# Patient Record
Sex: MALE | Race: WHITE | NOT HISPANIC OR LATINO | Employment: OTHER | URBAN - METROPOLITAN AREA
[De-identification: names, ages, dates, MRNs, and addresses within clinical notes are randomized per-mention and may not be internally consistent; named-entity substitution may affect disease eponyms.]

---

## 2018-06-23 ENCOUNTER — APPOINTMENT (OUTPATIENT)
Dept: CARDIOLOGY | Facility: CLINIC | Age: 83
DRG: 280 | End: 2018-06-23
Attending: INTERNAL MEDICINE
Payer: MEDICARE

## 2018-06-23 ENCOUNTER — APPOINTMENT (OUTPATIENT)
Dept: GENERAL RADIOLOGY | Facility: CLINIC | Age: 83
DRG: 280 | End: 2018-06-23
Attending: EMERGENCY MEDICINE
Payer: MEDICARE

## 2018-06-23 ENCOUNTER — HOSPITAL ENCOUNTER (INPATIENT)
Facility: CLINIC | Age: 83
LOS: 1 days | Discharge: HOME | DRG: 280 | End: 2018-06-24
Attending: EMERGENCY MEDICINE | Admitting: INTERNAL MEDICINE
Payer: MEDICARE

## 2018-06-23 ENCOUNTER — APPOINTMENT (OUTPATIENT)
Dept: ULTRASOUND IMAGING | Facility: CLINIC | Age: 83
DRG: 280 | End: 2018-06-23
Attending: INTERNAL MEDICINE
Payer: MEDICARE

## 2018-06-23 ENCOUNTER — APPOINTMENT (OUTPATIENT)
Dept: GENERAL RADIOLOGY | Facility: CLINIC | Age: 83
DRG: 280 | End: 2018-06-23
Attending: INTERNAL MEDICINE
Payer: MEDICARE

## 2018-06-23 DIAGNOSIS — J81.0 ACUTE PULMONARY EDEMA (H): ICD-10-CM

## 2018-06-23 DIAGNOSIS — I21.A1 TYPE 2 MYOCARDIAL INFARCTION (H): ICD-10-CM

## 2018-06-23 PROBLEM — R07.9 CHEST PAIN: Status: ACTIVE | Noted: 2018-06-23

## 2018-06-23 LAB
ALBUMIN SERPL-MCNC: 3.6 G/DL (ref 3.4–5)
ALBUMIN UR-MCNC: 10 MG/DL
ALP SERPL-CCNC: 124 U/L (ref 40–150)
ALP SERPL-CCNC: 126 U/L (ref 40–150)
ALP SERPL-CCNC: 131 U/L (ref 40–150)
ALT SERPL W P-5'-P-CCNC: 62 U/L (ref 0–70)
ALT SERPL W P-5'-P-CCNC: 69 U/L (ref 0–70)
ALT SERPL W P-5'-P-CCNC: 72 U/L (ref 0–70)
ANION GAP SERPL CALCULATED.3IONS-SCNC: 11 MMOL/L (ref 3–14)
ANION GAP SERPL CALCULATED.3IONS-SCNC: 11 MMOL/L (ref 3–14)
ANION GAP SERPL CALCULATED.3IONS-SCNC: 12 MMOL/L (ref 3–14)
ANION GAP SERPL CALCULATED.3IONS-SCNC: 9 MMOL/L (ref 3–14)
APPEARANCE UR: CLEAR
AST SERPL W P-5'-P-CCNC: 60 U/L (ref 0–45)
AST SERPL W P-5'-P-CCNC: 74 U/L (ref 0–45)
AST SERPL W P-5'-P-CCNC: 92 U/L (ref 0–45)
BASOPHILS # BLD AUTO: 0.1 10E9/L (ref 0–0.2)
BASOPHILS NFR BLD AUTO: 0.5 %
BILIRUB SERPL-MCNC: 0.3 MG/DL (ref 0.2–1.3)
BILIRUB SERPL-MCNC: 0.4 MG/DL (ref 0.2–1.3)
BILIRUB SERPL-MCNC: 0.7 MG/DL (ref 0.2–1.3)
BILIRUB UR QL STRIP: NEGATIVE
BUN SERPL-MCNC: 35 MG/DL (ref 7–30)
BUN SERPL-MCNC: 35 MG/DL (ref 7–30)
BUN SERPL-MCNC: 37 MG/DL (ref 7–30)
BUN SERPL-MCNC: 37 MG/DL (ref 7–30)
CALCIUM SERPL-MCNC: 8.4 MG/DL (ref 8.5–10.1)
CALCIUM SERPL-MCNC: 8.4 MG/DL (ref 8.5–10.1)
CALCIUM SERPL-MCNC: 8.5 MG/DL (ref 8.5–10.1)
CALCIUM SERPL-MCNC: 8.6 MG/DL (ref 8.5–10.1)
CHLORIDE SERPL-SCNC: 104 MMOL/L (ref 94–109)
CHLORIDE SERPL-SCNC: 106 MMOL/L (ref 94–109)
CHLORIDE SERPL-SCNC: 106 MMOL/L (ref 94–109)
CHLORIDE SERPL-SCNC: 107 MMOL/L (ref 94–109)
CO2 BLDCOV-SCNC: 24 MMOL/L (ref 21–28)
CO2 SERPL-SCNC: 21 MMOL/L (ref 20–32)
CO2 SERPL-SCNC: 23 MMOL/L (ref 20–32)
CO2 SERPL-SCNC: 24 MMOL/L (ref 20–32)
CO2 SERPL-SCNC: 24 MMOL/L (ref 20–32)
COLOR UR AUTO: ABNORMAL
CREAT SERPL-MCNC: 1.44 MG/DL (ref 0.66–1.25)
CREAT SERPL-MCNC: 1.45 MG/DL (ref 0.66–1.25)
CREAT SERPL-MCNC: 1.45 MG/DL (ref 0.66–1.25)
CREAT SERPL-MCNC: 1.56 MG/DL (ref 0.66–1.25)
DIFFERENTIAL METHOD BLD: ABNORMAL
EOSINOPHIL # BLD AUTO: 0.2 10E9/L (ref 0–0.7)
EOSINOPHIL NFR BLD AUTO: 1.9 %
ERYTHROCYTE [DISTWIDTH] IN BLOOD BY AUTOMATED COUNT: 14.5 % (ref 10–15)
ERYTHROCYTE [DISTWIDTH] IN BLOOD BY AUTOMATED COUNT: 14.6 % (ref 10–15)
ERYTHROCYTE [DISTWIDTH] IN BLOOD BY AUTOMATED COUNT: 14.6 % (ref 10–15)
GFR SERPL CREATININE-BSD FRML MDRD: 43 ML/MIN/1.7M2
GFR SERPL CREATININE-BSD FRML MDRD: 46 ML/MIN/1.7M2
GFR SERPL CREATININE-BSD FRML MDRD: 46 ML/MIN/1.7M2
GFR SERPL CREATININE-BSD FRML MDRD: 47 ML/MIN/1.7M2
GLUCOSE SERPL-MCNC: 108 MG/DL (ref 70–99)
GLUCOSE SERPL-MCNC: 119 MG/DL (ref 70–99)
GLUCOSE SERPL-MCNC: 205 MG/DL (ref 70–99)
GLUCOSE SERPL-MCNC: 228 MG/DL (ref 70–99)
GLUCOSE UR STRIP-MCNC: NEGATIVE MG/DL
HCT VFR BLD AUTO: 34.9 % (ref 40–53)
HCT VFR BLD AUTO: 36.4 % (ref 40–53)
HCT VFR BLD AUTO: 36.5 % (ref 40–53)
HGB BLD-MCNC: 11.8 G/DL (ref 13.3–17.7)
HGB BLD-MCNC: 11.8 G/DL (ref 13.3–17.7)
HGB BLD-MCNC: 12.1 G/DL (ref 13.3–17.7)
HGB UR QL STRIP: NEGATIVE
HYALINE CASTS #/AREA URNS LPF: 1 /LPF (ref 0–2)
IMM GRANULOCYTES # BLD: 0 10E9/L (ref 0–0.4)
IMM GRANULOCYTES NFR BLD: 0.4 %
KETONES UR STRIP-MCNC: NEGATIVE MG/DL
LACTATE BLD-SCNC: 1.8 MMOL/L (ref 0.7–2.1)
LEUKOCYTE ESTERASE UR QL STRIP: NEGATIVE
LMWH PPP CHRO-ACNC: 0.34 IU/ML
LMWH PPP CHRO-ACNC: 0.63 IU/ML
LYMPHOCYTES # BLD AUTO: 2 10E9/L (ref 0.8–5.3)
LYMPHOCYTES NFR BLD AUTO: 20.9 %
MAGNESIUM SERPL-MCNC: 1.8 MG/DL (ref 1.6–2.3)
MAGNESIUM SERPL-MCNC: 1.8 MG/DL (ref 1.6–2.3)
MCH RBC QN AUTO: 30.3 PG (ref 26.5–33)
MCH RBC QN AUTO: 30.8 PG (ref 26.5–33)
MCH RBC QN AUTO: 31.1 PG (ref 26.5–33)
MCHC RBC AUTO-ENTMCNC: 32.3 G/DL (ref 31.5–36.5)
MCHC RBC AUTO-ENTMCNC: 33.2 G/DL (ref 31.5–36.5)
MCHC RBC AUTO-ENTMCNC: 33.8 G/DL (ref 31.5–36.5)
MCV RBC AUTO: 92 FL (ref 78–100)
MCV RBC AUTO: 93 FL (ref 78–100)
MCV RBC AUTO: 94 FL (ref 78–100)
MONOCYTES # BLD AUTO: 0.6 10E9/L (ref 0–1.3)
MONOCYTES NFR BLD AUTO: 5.8 %
NEUTROPHILS # BLD AUTO: 6.7 10E9/L (ref 1.6–8.3)
NEUTROPHILS NFR BLD AUTO: 70.5 %
NITRATE UR QL: NEGATIVE
NRBC # BLD AUTO: 0 10*3/UL
NRBC BLD AUTO-RTO: 0 /100
NT-PROBNP SERPL-MCNC: 2358 PG/ML (ref 0–1800)
PCO2 BLDV: 49 MM HG (ref 40–50)
PH BLDV: 7.31 PH (ref 7.32–7.43)
PH UR STRIP: 5 PH (ref 5–7)
PHOSPHATE SERPL-MCNC: 4.4 MG/DL (ref 2.5–4.5)
PLATELET # BLD AUTO: 218 10E9/L (ref 150–450)
PLATELET # BLD AUTO: 225 10E9/L (ref 150–450)
PLATELET # BLD AUTO: 227 10E9/L (ref 150–450)
PO2 BLDV: 22 MM HG (ref 25–47)
POTASSIUM SERPL-SCNC: 3.6 MMOL/L (ref 3.4–5.3)
POTASSIUM SERPL-SCNC: 3.7 MMOL/L (ref 3.4–5.3)
POTASSIUM SERPL-SCNC: 4.3 MMOL/L (ref 3.4–5.3)
POTASSIUM SERPL-SCNC: 4.7 MMOL/L (ref 3.4–5.3)
PROT SERPL-MCNC: 7.1 G/DL (ref 6.8–8.8)
RBC # BLD AUTO: 3.79 10E12/L (ref 4.4–5.9)
RBC # BLD AUTO: 3.9 10E12/L (ref 4.4–5.9)
RBC # BLD AUTO: 3.93 10E12/L (ref 4.4–5.9)
RBC #/AREA URNS AUTO: 0 /HPF (ref 0–2)
SAO2 % BLDV FROM PO2: 31 %
SODIUM SERPL-SCNC: 139 MMOL/L (ref 133–144)
SODIUM SERPL-SCNC: 141 MMOL/L (ref 133–144)
SOURCE: ABNORMAL
SP GR UR STRIP: 1 (ref 1–1.03)
TROPONIN I BLD-MCNC: 0.09 UG/L (ref 0–0.1)
TROPONIN I SERPL-MCNC: 0.1 UG/L (ref 0–0.04)
TROPONIN I SERPL-MCNC: 0.6 UG/L (ref 0–0.04)
TROPONIN I SERPL-MCNC: 1.43 UG/L (ref 0–0.04)
TROPONIN I SERPL-MCNC: 2.52 UG/L (ref 0–0.04)
TROPONIN I SERPL-MCNC: 2.91 UG/L (ref 0–0.04)
TROPONIN I SERPL-MCNC: 3.3 UG/L (ref 0–0.04)
UROBILINOGEN UR STRIP-MCNC: NORMAL MG/DL (ref 0–2)
WBC # BLD AUTO: 11.5 10E9/L (ref 4–11)
WBC # BLD AUTO: 15.2 10E9/L (ref 4–11)
WBC # BLD AUTO: 9.5 10E9/L (ref 4–11)
WBC #/AREA URNS AUTO: <1 /HPF (ref 0–5)

## 2018-06-23 PROCEDURE — 85025 COMPLETE CBC W/AUTO DIFF WBC: CPT | Performed by: EMERGENCY MEDICINE

## 2018-06-23 PROCEDURE — 85520 HEPARIN ASSAY: CPT | Performed by: INTERNAL MEDICINE

## 2018-06-23 PROCEDURE — 93308 TTE F-UP OR LMTD: CPT | Performed by: EMERGENCY MEDICINE

## 2018-06-23 PROCEDURE — 80048 BASIC METABOLIC PNL TOTAL CA: CPT | Performed by: INTERNAL MEDICINE

## 2018-06-23 PROCEDURE — 96375 TX/PRO/DX INJ NEW DRUG ADDON: CPT | Performed by: EMERGENCY MEDICINE

## 2018-06-23 PROCEDURE — 96376 TX/PRO/DX INJ SAME DRUG ADON: CPT | Performed by: EMERGENCY MEDICINE

## 2018-06-23 PROCEDURE — 85027 COMPLETE CBC AUTOMATED: CPT | Performed by: EMERGENCY MEDICINE

## 2018-06-23 PROCEDURE — 93306 TTE W/DOPPLER COMPLETE: CPT

## 2018-06-23 PROCEDURE — 84484 ASSAY OF TROPONIN QUANT: CPT | Performed by: EMERGENCY MEDICINE

## 2018-06-23 PROCEDURE — 36415 COLL VENOUS BLD VENIPUNCTURE: CPT | Performed by: INTERNAL MEDICINE

## 2018-06-23 PROCEDURE — 85027 COMPLETE CBC AUTOMATED: CPT | Performed by: INTERNAL MEDICINE

## 2018-06-23 PROCEDURE — 80053 COMPREHEN METABOLIC PANEL: CPT | Performed by: EMERGENCY MEDICINE

## 2018-06-23 PROCEDURE — 12000012 ZZH R&B MS OVERFLOW UMMC

## 2018-06-23 PROCEDURE — A9270 NON-COVERED ITEM OR SERVICE: HCPCS | Mod: GY | Performed by: INTERNAL MEDICINE

## 2018-06-23 PROCEDURE — 83735 ASSAY OF MAGNESIUM: CPT | Performed by: INTERNAL MEDICINE

## 2018-06-23 PROCEDURE — 84100 ASSAY OF PHOSPHORUS: CPT | Performed by: INTERNAL MEDICINE

## 2018-06-23 PROCEDURE — 99223 1ST HOSP IP/OBS HIGH 75: CPT | Mod: 25 | Performed by: INTERNAL MEDICINE

## 2018-06-23 PROCEDURE — 25000128 H RX IP 250 OP 636: Performed by: INTERNAL MEDICINE

## 2018-06-23 PROCEDURE — 25000132 ZZH RX MED GY IP 250 OP 250 PS 637: Mod: GY | Performed by: INTERNAL MEDICINE

## 2018-06-23 PROCEDURE — 25000128 H RX IP 250 OP 636: Performed by: EMERGENCY MEDICINE

## 2018-06-23 PROCEDURE — 84484 ASSAY OF TROPONIN QUANT: CPT | Performed by: INTERNAL MEDICINE

## 2018-06-23 PROCEDURE — 99285 EMERGENCY DEPT VISIT HI MDM: CPT | Mod: 25 | Performed by: EMERGENCY MEDICINE

## 2018-06-23 PROCEDURE — 99291 CRITICAL CARE FIRST HOUR: CPT | Mod: 25 | Performed by: EMERGENCY MEDICINE

## 2018-06-23 PROCEDURE — 80053 COMPREHEN METABOLIC PANEL: CPT | Performed by: INTERNAL MEDICINE

## 2018-06-23 PROCEDURE — 76770 US EXAM ABDO BACK WALL COMP: CPT | Mod: XS

## 2018-06-23 PROCEDURE — 71045 X-RAY EXAM CHEST 1 VIEW: CPT | Mod: 77

## 2018-06-23 PROCEDURE — 93306 TTE W/DOPPLER COMPLETE: CPT | Mod: 26 | Performed by: INTERNAL MEDICINE

## 2018-06-23 PROCEDURE — 82803 BLOOD GASES ANY COMBINATION: CPT

## 2018-06-23 PROCEDURE — 25500064 ZZH RX 255 OP 636: Performed by: INTERNAL MEDICINE

## 2018-06-23 PROCEDURE — 93010 ELECTROCARDIOGRAM REPORT: CPT | Mod: 59 | Performed by: EMERGENCY MEDICINE

## 2018-06-23 PROCEDURE — 81001 URINALYSIS AUTO W/SCOPE: CPT | Performed by: EMERGENCY MEDICINE

## 2018-06-23 PROCEDURE — 84484 ASSAY OF TROPONIN QUANT: CPT

## 2018-06-23 PROCEDURE — 93308 TTE F-UP OR LMTD: CPT | Mod: 26 | Performed by: EMERGENCY MEDICINE

## 2018-06-23 PROCEDURE — 83880 ASSAY OF NATRIURETIC PEPTIDE: CPT | Performed by: EMERGENCY MEDICINE

## 2018-06-23 PROCEDURE — 93005 ELECTROCARDIOGRAM TRACING: CPT | Performed by: EMERGENCY MEDICINE

## 2018-06-23 PROCEDURE — 71045 X-RAY EXAM CHEST 1 VIEW: CPT

## 2018-06-23 PROCEDURE — 96365 THER/PROPH/DIAG IV INF INIT: CPT | Performed by: EMERGENCY MEDICINE

## 2018-06-23 PROCEDURE — 83605 ASSAY OF LACTIC ACID: CPT

## 2018-06-23 RX ORDER — MULTIVIT WITH MINERALS/LUTEIN
1 TABLET ORAL DAILY
COMMUNITY

## 2018-06-23 RX ORDER — ISOSORBIDE MONONITRATE 30 MG/1
30 TABLET, EXTENDED RELEASE ORAL DAILY
Status: DISCONTINUED | OUTPATIENT
Start: 2018-06-23 | End: 2018-06-23

## 2018-06-23 RX ORDER — POTASSIUM CHLORIDE 750 MG/1
40 TABLET, EXTENDED RELEASE ORAL ONCE
Status: COMPLETED | OUTPATIENT
Start: 2018-06-23 | End: 2018-06-23

## 2018-06-23 RX ORDER — POTASSIUM CHLORIDE 750 MG/1
20-40 TABLET, EXTENDED RELEASE ORAL
Status: DISCONTINUED | OUTPATIENT
Start: 2018-06-23 | End: 2018-06-24 | Stop reason: HOSPADM

## 2018-06-23 RX ORDER — MAGNESIUM SULFATE HEPTAHYDRATE 40 MG/ML
2 INJECTION, SOLUTION INTRAVENOUS ONCE
Status: COMPLETED | OUTPATIENT
Start: 2018-06-23 | End: 2018-06-23

## 2018-06-23 RX ORDER — POTASSIUM CHLORIDE 29.8 MG/ML
20 INJECTION INTRAVENOUS
Status: DISCONTINUED | OUTPATIENT
Start: 2018-06-23 | End: 2018-06-24 | Stop reason: HOSPADM

## 2018-06-23 RX ORDER — OMEGA-3-ACID ETHYL ESTERS 1 G/1
2 CAPSULE, LIQUID FILLED ORAL DAILY
COMMUNITY

## 2018-06-23 RX ORDER — LOSARTAN POTASSIUM 50 MG/1
50 TABLET ORAL DAILY
Status: DISCONTINUED | OUTPATIENT
Start: 2018-06-23 | End: 2018-06-23

## 2018-06-23 RX ORDER — ISOSORBIDE MONONITRATE 30 MG/1
30 TABLET, EXTENDED RELEASE ORAL DAILY
Status: DISCONTINUED | OUTPATIENT
Start: 2018-06-24 | End: 2018-06-24 | Stop reason: HOSPADM

## 2018-06-23 RX ORDER — POTASSIUM CHLORIDE 7.45 MG/ML
10 INJECTION INTRAVENOUS
Status: DISCONTINUED | OUTPATIENT
Start: 2018-06-23 | End: 2018-06-24 | Stop reason: HOSPADM

## 2018-06-23 RX ORDER — LOPERAMIDE HCL 2 MG
2 CAPSULE ORAL 4 TIMES DAILY PRN
COMMUNITY

## 2018-06-23 RX ORDER — ASPIRIN 81 MG/1
81 TABLET, CHEWABLE ORAL DAILY
Status: DISCONTINUED | OUTPATIENT
Start: 2018-06-23 | End: 2018-06-24 | Stop reason: HOSPADM

## 2018-06-23 RX ORDER — NALOXONE HYDROCHLORIDE 0.4 MG/ML
.1-.4 INJECTION, SOLUTION INTRAMUSCULAR; INTRAVENOUS; SUBCUTANEOUS
Status: DISCONTINUED | OUTPATIENT
Start: 2018-06-23 | End: 2018-06-24 | Stop reason: HOSPADM

## 2018-06-23 RX ORDER — POTASSIUM CHLORIDE 1.5 G/1.58G
40 POWDER, FOR SOLUTION ORAL ONCE
Status: COMPLETED | OUTPATIENT
Start: 2018-06-23 | End: 2018-06-23

## 2018-06-23 RX ORDER — ATORVASTATIN CALCIUM 40 MG/1
40 TABLET, FILM COATED ORAL
Status: DISCONTINUED | OUTPATIENT
Start: 2018-06-23 | End: 2018-06-24 | Stop reason: HOSPADM

## 2018-06-23 RX ORDER — LIDOCAINE 40 MG/G
CREAM TOPICAL
Status: DISCONTINUED | OUTPATIENT
Start: 2018-06-23 | End: 2018-06-24 | Stop reason: HOSPADM

## 2018-06-23 RX ORDER — CLOPIDOGREL BISULFATE 75 MG/1
75 TABLET ORAL DAILY
Status: DISCONTINUED | OUTPATIENT
Start: 2018-06-23 | End: 2018-06-24 | Stop reason: HOSPADM

## 2018-06-23 RX ORDER — PANTOPRAZOLE SODIUM 40 MG/1
40 TABLET, DELAYED RELEASE ORAL EVERY MORNING
Status: DISCONTINUED | OUTPATIENT
Start: 2018-06-23 | End: 2018-06-24 | Stop reason: HOSPADM

## 2018-06-23 RX ORDER — HEPARIN SODIUM 10000 [USP'U]/100ML
0-3500 INJECTION, SOLUTION INTRAVENOUS CONTINUOUS
Status: DISCONTINUED | OUTPATIENT
Start: 2018-06-23 | End: 2018-06-24 | Stop reason: HOSPADM

## 2018-06-23 RX ORDER — FUROSEMIDE 10 MG/ML
40 INJECTION INTRAMUSCULAR; INTRAVENOUS ONCE
Status: COMPLETED | OUTPATIENT
Start: 2018-06-23 | End: 2018-06-23

## 2018-06-23 RX ORDER — POTASSIUM CHLORIDE 1.5 G/1.58G
20-40 POWDER, FOR SOLUTION ORAL
Status: DISCONTINUED | OUTPATIENT
Start: 2018-06-23 | End: 2018-06-24 | Stop reason: HOSPADM

## 2018-06-23 RX ORDER — UBIDECARENONE 200 MG
200 CAPSULE ORAL DAILY
COMMUNITY

## 2018-06-23 RX ORDER — ISOSORBIDE MONONITRATE 30 MG/1
60 TABLET, EXTENDED RELEASE ORAL DAILY
Status: DISCONTINUED | OUTPATIENT
Start: 2018-06-23 | End: 2018-06-23

## 2018-06-23 RX ORDER — POTASSIUM CL/LIDO/0.9 % NACL 10MEQ/0.1L
10 INTRAVENOUS SOLUTION, PIGGYBACK (ML) INTRAVENOUS
Status: DISCONTINUED | OUTPATIENT
Start: 2018-06-23 | End: 2018-06-24 | Stop reason: HOSPADM

## 2018-06-23 RX ADMIN — Medication 12.5 MG: at 19:50

## 2018-06-23 RX ADMIN — HUMAN ALBUMIN MICROSPHERES AND PERFLUTREN 4 ML: 10; .22 INJECTION, SOLUTION INTRAVENOUS at 10:15

## 2018-06-23 RX ADMIN — HEPARIN SODIUM 950 UNITS/HR: 10000 INJECTION, SOLUTION INTRAVENOUS at 02:59

## 2018-06-23 RX ADMIN — ISOSORBIDE MONONITRATE 60 MG: 30 TABLET, EXTENDED RELEASE ORAL at 08:05

## 2018-06-23 RX ADMIN — FUROSEMIDE 40 MG: 10 INJECTION, SOLUTION INTRAVENOUS at 08:05

## 2018-06-23 RX ADMIN — ASPIRIN 81 MG CHEWABLE TABLET 81 MG: 81 TABLET CHEWABLE at 08:05

## 2018-06-23 RX ADMIN — POTASSIUM CHLORIDE 40 MEQ: 1.5 POWDER, FOR SOLUTION ORAL at 18:55

## 2018-06-23 RX ADMIN — ATORVASTATIN CALCIUM 40 MG: 40 TABLET, FILM COATED ORAL at 19:49

## 2018-06-23 RX ADMIN — PANTOPRAZOLE SODIUM 40 MG: 40 TABLET, DELAYED RELEASE ORAL at 08:05

## 2018-06-23 RX ADMIN — CLOPIDOGREL 75 MG: 75 TABLET, FILM COATED ORAL at 08:05

## 2018-06-23 RX ADMIN — Medication 12.5 MG: at 11:34

## 2018-06-23 RX ADMIN — MAGNESIUM SULFATE HEPTAHYDRATE 2 G: 40 INJECTION, SOLUTION INTRAVENOUS at 23:22

## 2018-06-23 RX ADMIN — POTASSIUM CHLORIDE 40 MEQ: 750 TABLET, EXTENDED RELEASE ORAL at 09:40

## 2018-06-23 RX ADMIN — FUROSEMIDE 40 MG: 10 INJECTION, SOLUTION INTRAVENOUS at 01:25

## 2018-06-23 NOTE — ED NOTES
Pt told EMS he is on blood thinners but did not know the names, the bottles were not labeled at the house    He got 4 baby ASA from EMS.

## 2018-06-23 NOTE — ED TRIAGE NOTES
Pt woke up at home per a friend and was SOB. He was given 6 nitros, and 2 duonebs. He is on cpap from ems, he is from [place] and we do not have records here. He is also hypertensive per EMS.

## 2018-06-23 NOTE — PROGRESS NOTES
Pt arrived to  room number 4503 from ED at 0400 as 6C overflow. Pt orientated to room and unit. Pt's call light within reach. All questions answered.   Pt ST, BP 140s/90s -110s, and on 30% Bipap. Bipap d/c'd immediately and placed on 4 L NC. Pt denies chest pain and SOB. Notified MD troponin increased from 0.1 to 0.597. Plan to continue with heparin gtt. TTE ordered for am, will recheck troponin at 0500.

## 2018-06-23 NOTE — PROVIDER NOTIFICATION
0548 MD notified of critical lab result of troponin of 1.4. No orders written at this time.  Plan to continue with heparin gtt.

## 2018-06-23 NOTE — PLAN OF CARE
Problem: Patient Care Overview  Goal: Plan of Care/Patient Progress Review  Outcome: Improving  Afebrile, VSS.  Patient denies chest pain throughout the day.  Troponin peaked, now trending down.  No cath lab unless active chest pain per MD.  Heparin drip adjusted based on Xa level, currently at 750 units/hr and now therapeutic.  Additional dose of Lasix given this AM.  Patient having runs of SVT and leg cramps this afternoon.  Labs checked, potassium replacement ordered.  On cardiac diet, tolerating well.  Plan for discharge in the morning if no chest pain overnight and when patient is up and walking around.

## 2018-06-23 NOTE — IP AVS SNAPSHOT
[address] [address]    Phone:  [phone]                                       After Visit Summary   6/23/2018    Kane Richards    MRN: [id number]           After Visit Summary Signature Page     I have received my discharge instructions, and my questions have been answered. I have discussed any challenges I see with this plan with the nurse or doctor.    ..........................................................................................................................................  Patient/Patient Representative Signature      ..........................................................................................................................................  Patient Representative Print Name and Relationship to Patient    ..................................................               ................................................  Date                                            Time    ..........................................................................................................................................  Reviewed by Signature/Title    ...................................................              ..............................................  Date                                                            Time

## 2018-06-23 NOTE — PLAN OF CARE
Problem: Patient Care Overview  Goal: Plan of Care/Patient Progress Review  Outcome: No Change  Assessment:   Cardiac: SR/ST and VSS. Heparing gtt infusing.   Resp: Tolerating 4 L NC.   Neuro: Alert and orientated.   : Voiding per urinal.   GI:  NPO  Pain:  Denies    Interventions: See previous note for events and interventions done during shift.        Plan: Will continue to monitor/assess and update MD as needed. TTE ordered for today, possible cath lab.

## 2018-06-23 NOTE — IP AVS SNAPSHOT
MRN:[id number]                      After Visit Summary   6/23/2018    Kane Richards    MRN: [id number]           Thank you!     Thank you for choosing [facility] for your care. Our goal is always to provide you with excellent care. Hearing back from our patients is one way we can continue to improve our services. Please take a few minutes to complete the written survey that you may receive in the mail after you visit with us. Thank you!        Patient Information     Date Of Birth          6/9/1934        Designated Caregiver       Most Recent Value    Caregiver    Will someone help with your care after discharge? yes    Name of designated caregiver Prince    Phone number of caregiver na    Caregiver address na      About your hospital stay     You were admitted on:  June 23, 2018 You last received care in the:  [address] [facility] [place]    You were discharged on:  June 24, 2018        Reason for your hospital stay       You were hospitalized for chest pain and elevated blood pressures. Your treatment included medications.                  Who to Call     For medical emergencies, please call 911.  For non-urgent questions about your medical care, please call your primary care provider or clinic, None          Attending Provider     Provider Specialty    Ramana Novak MD Emergency Medicine    Edy Boyce MD Clinical Cardiac Electrophysiology    Rohini Obrien MD Cardiology       Primary Care Provider Fax #    Physician No Ref-Primary [phone]      After Care Instructions     Activity       Your activity upon discharge: activity as tolerated            Diet       Follow this diet upon discharge: Orders Placed This Encounter      Fluid restriction 2000 ML FLUID      Low Saturated Fat Na <2400 mg                  Follow-up Appointments     Follow Up and recommended labs and tests       Follow up with primary care provider, Physician No Ref-Primary, within 7 days for hospital follow- up.   "The following labs/tests are recommended: BMP.                  Pending Results     Date and Time Order Name Status Description    2018 0133 EKG 12 lead Preliminary     2018 0133 EKG 12 lead Preliminary     2018 0115 EKG 12 lead Preliminary             Statement of Approval     Ordered          18 1117  I have reviewed and agree with all the recommendations and orders detailed in this document.  EFFECTIVE NOW     Approved and electronically signed by:  Yossi Cheng MD             Admission Information     Date & Time Provider Department Dept. Phone    2018 Rohini Obrien MD [address] [facility] [place] [phone]      Your Vitals Were     Blood Pressure Pulse Temperature Respirations Weight Pulse Oximetry    152/67 (BP Location: Left arm) 108 98  F (36.7  C) (Oral) 16 76.5 kg (168 lb 10.4 oz) 94%      MyChart Information     [employer] lets you send messages to your doctor, view your test results, renew your prescriptions, schedule appointments and more. To sign up, go to [email or web address] . Click on \"Log in\" on the left side of the screen, which will take you to the Welcome page. Then click on \"Sign up Now\" on the right side of the page.     You will be asked to enter the access code listed below, as well as some personal information. Please follow the directions to create your username and password.     Your access code is: [email or web address]  Expires: 2018 12:16 PM     Your access code will  in 90 days. If you need help or a new code, please call your [facility] clinic or [phone].        Care EveryWhere ID     This is your Care EveryWhere ID. This could be used by other organizations to access your [facility] medical records  [id number]        Equal Access to Services     [facility] ROLANDA : Irene Lewis, wamarcus bella, qaludivina kasharita viera, neelam medina. So [facility] [phone].    ATENCIÓN: Si habla español, tiene a bocanegra disposición " servicios gratuitos de asistencia lingüística. Gwen russ [phone].    We comply with applicable federal civil rights laws and [place] laws. We do not discriminate on the basis of race, color, national origin, age, disability, sex, sexual orientation, or gender identity.               Review of your medicines      CONTINUE these medicines which have NOT CHANGED        Dose / Directions    ASPIRIN PO        Dose:  81 mg   Take 81 mg by mouth daily   Refills:  0       ATORVASTATIN CALCIUM PO        Dose:  40 mg   Take 40 mg by mouth daily   Refills:  0       CENTRUM SILVER per tablet        Dose:  1 tablet   Take 1 tablet by mouth daily   Refills:  0       CoQ-10 200 MG Caps        Dose:  200 mg   Take 200 mg by mouth daily   Refills:  0       COZAAR PO        Dose:  100 mg   Take 100 mg by mouth daily   Refills:  0       ISOSORBIDE MONONITRATE PO        Dose:  30 mg   Take 30 mg by mouth daily   Refills:  0       loperamide 2 MG capsule   Commonly known as:  IMODIUM        Dose:  2 mg   Take 2 mg by mouth 4 times daily as needed for diarrhea   Refills:  0       LOPRESSOR PO        Dose:  25 mg   Take 25 mg by mouth 2 times daily   Refills:  0       omega-3 acid ethyl esters 1 g capsule   Commonly known as:  Lovaza        Dose:  2 g   Take 2 g by mouth daily   Refills:  0       PANTOPRAZOLE SODIUM PO        Dose:  40 mg   Take 40 mg by mouth every morning (before breakfast)   Refills:  0       PLAVIX PO        Dose:  75 mg   Take 75 mg by mouth daily   Refills:  0       TRAMADOL HCL PO        Refills:  0       VITAMIN E BLEND PO        Dose:  1000 Units   Take 1,000 Units by mouth daily   Refills:  0         STOP taking     ADVIL PO                    Protect others around you: Learn how to safely use, store and throw away your medicines at www.disposemymeds.org.        Information about OPIOIDS     PRESCRIPTION OPIOIDS: WHAT YOU NEED TO KNOW   We gave you an opioid (narcotic) pain medicine. It is important to  manage your pain, but opioids are not always the best choice. You should first try all the other options your care team gave you. Take this medicine for as short a time (and as few doses) as possible.     These medicines have risks:    DO NOT drive when on new or higher doses of pain medicine. These medicines can affect your alertness and reaction times, and you could be arrested for driving under the influence (DUI). If you need to use opioids long-term, talk to your care team about driving.    DO NOT operate heave machinery    DO NOT do any other dangerous activities while taking these medicines.     DO NOT drink any alcohol while taking these medicines.      If the opioid prescribed includes acetaminophen, DO NOT take with any other medicines that contain acetaminophen. Read all labels carefully. Look for the word  acetaminophen  or  Tylenol.  Ask your pharmacist if you have questions or are unsure.    You can get addicted to pain medicines, especially if you have a history of addiction (chemical, alcohol or substance dependence). Talk to your care team about ways to reduce this risk.    Store your pills in a secure place, locked if possible. We will not replace any lost or stolen medicine. If you don t finish your medicine, please throw away (dispose) as directed by your pharmacist. The [place] Pollution Control Agency has more information about safe disposal: https://www.pca.[facility].mn.us/living-green/managing-unwanted-medications.     All opioids tend to cause constipation. Drink plenty of water and eat foods that have a lot of fiber, such as fruits, vegetables, prune juice, apple juice and high-fiber cereal. Take a laxative (Miralax, milk of magnesia, Colace, Senna) if you don t move your bowels at least every other day.              Medication List: This is a list of all your medications and when to take them. Check marks below indicate your daily home schedule. Keep this list as a reference.       Medications           Morning Afternoon Evening Bedtime As Needed    ASPIRIN PO   Take 81 mg by mouth daily   Last time this was given:  81 mg on 6/24/2018  7:40 AM                                ATORVASTATIN CALCIUM PO   Take 40 mg by mouth daily   Last time this was given:  40 mg on 6/23/2018  7:49 PM                                CENTRUM SILVER per tablet   Take 1 tablet by mouth daily                                CoQ-10 200 MG Caps   Take 200 mg by mouth daily                                COZAAR PO   Take 100 mg by mouth daily                                ISOSORBIDE MONONITRATE PO   Take 30 mg by mouth daily                                loperamide 2 MG capsule   Commonly known as:  IMODIUM   Take 2 mg by mouth 4 times daily as needed for diarrhea                                LOPRESSOR PO   Take 25 mg by mouth 2 times daily                                omega-3 acid ethyl esters 1 g capsule   Commonly known as:  Lovaza   Take 2 g by mouth daily                                PANTOPRAZOLE SODIUM PO   Take 40 mg by mouth every morning (before breakfast)   Last time this was given:  40 mg on 6/24/2018  7:40 AM                                PLAVIX PO   Take 75 mg by mouth daily   Last time this was given:  75 mg on 6/24/2018  7:40 AM                                TRAMADOL HCL PO                                VITAMIN E BLEND PO   Take 1,000 Units by mouth daily

## 2018-06-23 NOTE — ED PROVIDER NOTES
History     Chief Complaint   Patient presents with     Shortness of Breath     HPI  Kane Richards is a 84 year old male with history of myocardial infarction,'s PCI with stent placement in October 2017, aortic stenosis who presents to the emergency department by EMS for for shortness of breath and chest pain.  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    Late yesterday evening he awoke with chest pain and severe shortness of breath, he felt like he was gasping.  Symptoms were dissimilar to his prior myocardial infarction.  On EMS arrival he was found to be severely hypertensive with systolics greater than 200, hypoxic down to 91%.  He was placed on BiPAP, given serially 6 nitroglycerin and ultimately improved while in route to the emergency department.  In the ED his saturations with supplemental oxygen through BiPAP were 100%, chest pain was present but decreasing and breathlessness was resolving significantly.    ECG noted ST depressions in V2 and V3 cardiology CSI team was consulted to discuss possibility of posterior ST elevation MI    Prior to this the patient had been feeling completely well    I have reviewed the Medications, Allergies, Past Medical and Surgical History, and Social History in the Epic system.    Review of Systems   14 point review of symptoms was performed and is negative except as noted above.     Physical Exam   BP: 157/87  Pulse: 108  Heart Rate: 88  Temp: 97.8  F (36.6  C)  Resp: 20  Weight: 79.4 kg (175 lb) (no scale bed)  SpO2: 98 %      Physical Exam  GEN: Well appearing, non toxic, cooperative and conversant.   HEENT: The head is normocephalic and atraumatic. Pupils are equal round and reactive to light. Extraocular motions are intact. There is no facial swelling. The neck is nontender and supple.   CV: Regular rate and rhythm without murmurs rubs or gallops. 2+ radial pulses bilaterally.  PULM: Clear to auscultation bilaterally.  ABD: Soft, nontender,  nondistended.   EXT: Full range of motion.  No edema.  NEURO: Cranial nerves II through XII are intact and symmetric. Bilateral upper and lower extremities grossly show full range of motion without any focal deficits.   SKIN: No rashes, ecchymosis, or lacerations  PSYCH: Calm and cooperative, interactive.     ED Course     ED Course     Procedures  Results for orders placed during the hospital encounter of 06/23/18   POC US ECHO LIMITED    Impression Limited Bedside Cardiac Ultrasound, performed and interpreted by me.   Indication: Chest Pain and Shortness of Breath.  Parasternal long axis, parasternal short axis and apical 4 chamber views were acquired.   Image quality was satisfactory.    Findings:    Abnormal: There is left ventricular dilatation with decreased global function.  Sclerotic aortic valve    IMPRESSION: As above.  The posterior wall of the left ventricle appears to have intact contractility on this bedside, limited exam        Critical care time: 1 hour for this patient with acute pulmonary edema and likely type II myocardial infarction       Labs Ordered and Resulted from Time of ED Arrival Up to the Time of Departure from the ED   CBC WITH PLATELETS DIFFERENTIAL - Abnormal; Notable for the following:        Result Value    RBC Count 3.90 (*)     Hemoglobin 11.8 (*)     Hematocrit 36.5 (*)     All other components within normal limits   COMPREHENSIVE METABOLIC PANEL - Abnormal; Notable for the following:     Glucose 228 (*)     Urea Nitrogen 35 (*)     Creatinine 1.56 (*)     GFR Estimate 43 (*)     GFR Estimate If Black 52 (*)     Calcium 8.4 (*)     ALT 72 (*)     AST 92 (*)     All other components within normal limits   NT PROBNP INPATIENT - Abnormal; Notable for the following:     N-Terminal Pro BNP Inpatient 2358 (*)     All other components within normal limits   TROPONIN I - Abnormal; Notable for the following:     Troponin I ES 0.103 (*)     All other components within normal limits    ROUTINE UA WITH MICROSCOPIC - Abnormal; Notable for the following:     Protein Albumin Urine 10 (*)     All other components within normal limits   CBC WITH PLATELETS - Abnormal; Notable for the following:     WBC 15.2 (*)     RBC Count 3.93 (*)     Hemoglobin 12.1 (*)     Hematocrit 36.4 (*)     All other components within normal limits   TROPONIN I - Abnormal; Notable for the following:     Troponin I ES 0.597 (*)     All other components within normal limits   ISTAT  GASES LACTATE JERSEY POCT - Abnormal; Notable for the following:     Ph Venous 7.31 (*)     PO2 Venous 22 (*)     All other components within normal limits   ISTAT CG4 GASES LACTATE JERSEY NURSING POCT   ISTAT TROPONIN NURSING POCT   PLATELETS MONITORED PER HEPARIN TREATMENT PROTOCOL (FOR MEANINGFUL USE   NOTIFY PHYSICIAN   NOTIFY PHYSICIAN   TROPONIN POCT            Assessments & Plan (with Medical Decision Making)   84-year-old male with coronary artery disease, MI presenting with acute onset chest pressure and shortness of breath    DDX:  Acute coronary syndrome, pulmonary embolism, pneumonia, pneumothorax, congestive heart failure, uremia, renal failure, among other causes.     The patient appeared to be extremis, nearly intubated by EMS but on arrival to the emergency department is in improved condition.  He received an additional sublingual nitroglycerin, lasix 40 mg IV, continued BiPAP.    His chest pain resolved.  Serial EKGs showed steady improvement and resolution of ST depressions in V2 and V3, right-sided leads showed no evidence of douglas ST elevation  Chest x-ray and bedside ultrasound confirmed significant pulmonary edema  Patient was discussed with CSI and ultimately carts when attending.  Due to his absence of chest pain and improved appearance in the emergency department, no douglas ST elevation on his ECG, the patient will not go directly for coronary angiography.    First troponin 0.1, and cardiology team aware, we both agree this is  most likely type II MI, but patient will need to be watched closely  Patient did have one episode of recurrence of chest pain for which a repeat EKG was performed and showed no acute abnormalities.  Patient admitted to the cards 1 service with BiPAP and heparin drip ongoing.  Received aspirin by EMS earlier.          I have reviewed the nursing notes.    I have reviewed the findings, diagnosis, plan and need for follow up with the patient.    Current Discharge Medication List          Final diagnoses:   Acute pulmonary edema (H)   Type 2 myocardial infarction       6/23/2018   [address] [facility]     Ramana Novak MD  06/23/18 0412

## 2018-06-23 NOTE — PROGRESS NOTES
Please see History and Physical dated on 6/23.     Briefly, 84 year old with a PMHx of CAD s/p CABG in 1997 (Unknown Anatomy) and PCI (most recently this year), Severe Aortic Stenosis (Mean Gradient >40, per patient) who presents with chest pain, shortness of breath. Patient has been having increasing chest pain with exertion over the past two weeks. Last night, patient was sleeping and woke up suddenly short of breath. He was found to have a SBP in the 200's by EMS. When he presented to the ED, he had ST depressions in his anterior and lateral leads. Patient's troponin was initially 0.1 but then increased to 1.43. He currently is not having any chest pain.     Overall, patient's clinical picture is most likely consistent with HTN emergency in the setting of known CAD and AS. However, given the degree of his troponin elevation, acute worsening of chest pain, and EKG changes, he needs a coronary angiogram to rule out ACS. Will continue medical management with ASA, Plavix, Statin, and Heparin. Will plan for coronary angiogram this morning.     If negative, troponin elevation likely 2/2 demand from HTN. Will also repeat ECHO to assess for WMA and degree of AS. Lastly, will order Renal US to rule out KEN.     Brianna Rivers PGY-4   Cardiology Fellow   Pager: [phone]       I have seen and examined the patient with the CSI team. I agree with the assessment and plan of the note above.I have reviewed pertinent labs.     Aidan Fish MD  Interventional Cardiology  Pager: [phone]

## 2018-06-23 NOTE — H&P
[facility] History and Physical    Kane Richards MRN# [id number]   Age: 84 year old YOB: 1934     Date of Admission: 6/23/2018    Primary care provider: No primary care provider on file.          Chief Complaint:   Dyspnea         History of Present Illness:   Kane Richards is a 84 year old male with a hx of CAD s/p CAB and aortic stenosis that presents with shortness of breath and chest pain. States that it woke up from sleep tonight. Is travelling here from [place] for the car show and has been on his feet all day walking around at the car show. Had one beer tonight and then went to bed. Woke up around midnight with extreme shortness of breath and midline chest squeezing chest discomfort associated with nausea. Chest discomfort was not like the pain he has had in the past with his MI's. Called EMS and was noted to be hypertensive in the 200's. Got 6 sublingual NTG's with resolution of pain, improvement in dyspnea, and reduction of BP. His oxygen saturation in the field was 91% and was placed on BIPAP. In the ED, his BP was 160/90 and he was tachy in the 100's. EKG's showed ST depression in V2 and V3 which later resolved. CXR showed signs of pulmonary edema and troponin was elevated. So, he was started on heparin gtt, given 40mg IV lasix, and admitted to Cards 1.   He endorses increasing dyspnea with exertion and occasional chest discomfort over the last few months. He is not very physically active owing to bad hips and knees but notices the discomfort with prolonged walking (few blocks). Last PCI with LUIS was in Feb, 2018 at [facility] in [place]. Prior to that, had another PCI in October, 2017 in [place].     ROS  10pt ROS was negative other than mentioned in the HPI          Past Medical History:   CAD, HFrEF, Aortic stenosis          Past Surgical History:   CAB         Social History:   Lives in [place], no smoking or IVDU, occasional alcohol. Takes a lot of long road trips.           Family History:   Reviewed and updated in EPIC          Allergies:   No Known Allergies          Medications:   Metop tartarate 25mg bid, losartan 100mg qd, DAPT with plavix, atorva 40mg qd, imdur 30mg qd, protonix 40mg qd          Physical Exam:   Vitals were reviewed  Blood pressure 157/87, pulse 108, temperature 97.8  F (36.6  C), temperature source Axillary, resp. rate 20, weight 79.4 kg (175 lb), SpO2 100 %.    General: Elderly male resting comfortably on BIPAP, able to speak in complete sentences without difficulty   HEENT: PERRL; No icterus, pallor, or injection, oral mucosa moist and non-erythematous; no LAD; JVP is elevated    CV: RRR, LISA at RUSB radiating to carotids without weak carotid pulse, holosystolic murmur at apex radiating to axilla  Resp: Coarse crackles at bases b/l   Abd: Soft, non-tender, BS+, no masses appreciated, no rebound or guarding  Extremities: Radial and pedal pulses intact and symmetric, no pedal edema  Neuro: AAOx3, conversing appopriately, no lateralizing signs  Skin: No rashes evident  MSK: No joint tenderness, warmth, or edema         Data:     Reviewed in EPIC          Assessment and Plan:   Assessment: Kane Richards is a 84 year old male with a hx of CAD s/p CAB and aortic stenosis that presents with an NSTEMI with flash pulmonary edema.     #NSTEMI: Typical cp + EKG changes + troponin elevation. Has a strong history of CAD with recent LUIS placement in Feb, 2018. Currently pain free.   -Low intensity heparin gtt  -Holding home BB due to acute HF  -NPO in case of LHC  -Trend troponin  -TTE in the AM.     #Acute hypoxic respiratory failure  #Flash pulmonary edema: Likely due to NSTEMI but could also be due hypertensive emergency or worsening aortic stenosis.   -Received 40mg IV lasix so will re-evaluate in the AM  -TTE in the AM to evaluate valves  -Will keep SBP <180mmHg    #Hypertensive emergency: In the 200's in the field. May have contributed to pulmonary edema. Denies  medication non-compliance  -Currently pressures normal in the 130's here, likely as a result of the 6 NTG's. So, will monitor    #CAD s/p CAB: Holding BB due to flash pulmonary edema. Holding ARB due to likely SANTHOSH. Continuing statin and DAPT. Continue imdur 30mg qd.     #Aortic stenosis: TTE in the AM to evaluate    #Non-oliguric SANTHOSH: Baseline cr unavailable but likely elevated in the setting of decreased forward flow. UA pending. Holding ARB.     #Elevated LFTs: Mild. Likely related to HF. Trending in the AM. If still elevated, can consider dc'ing statin.     #Macrocytic anemia, borderline: Unknown baseline.     #GERD: Cont PPI    FEN: NPO  Prophylaxis: Heparin  Consults: None  Code Status: Full    Patient will be staffed in the AM     Maryam Rivers  Internal Medicine, PGY-3  [phone]      I have seen and examined the patient with the CSI team. I agree with the assessment and plan of the note above.I have reviewed pertinent labs.     Aidan Fish MD  Interventional Cardiology  Pager: [phone]

## 2018-06-23 NOTE — ED NOTES
[facility] [place]   ED Nurse to Floor Handoff     Kane Richards is a 84 year old male who speaks English and lives alone,  in a home  They arrived in the ED by ambulance from home    ED Chief Complaint: Shortness of Breath    ED Dx;   Final diagnoses:   Acute pulmonary edema (H)         Needed?: No    Allergies: No Known Allergies.  Past Medical Hx: No past medical history on file. pt is from [place] visiting for a car show. He is not a [facility] pt, but says he has a cardiac history  Baseline Mental status: WDL  Current Mental Status changes: at basesline    Infection present or suspected this encounter: no  Sepsis suspected: No  Isolation type: No active isolations     Activity level - Baseline/Home:  Independent  Activity Level - Current:   Stand with Assist    Bariatric equipment needed?: No    In the ED these meds were given:   Medications   heparin Loading Dose from infusion pump *Give when STARTING heparin infusion 4,750 Units (not administered)   heparin  drip 25,000 units in 0.45% NaCl 250 mL (see additional administration details for dose) (not administered)   heparin bolus from infusion pump (not administered)   furosemide (LASIX) injection 40 mg (40 mg Intravenous Given 6/23/18 0125)       Drips running?  Yes    Home pump  No    Current LDAs  Peripheral IV 06/23/18 Hand (Active)   Site Assessment [employer] 6/23/2018  1:55 AM   Line Status Saline locked 6/23/2018  1:11 AM   Phlebitis Scale 0-->no symptoms 6/23/2018  1:55 AM   Dressing Intervention New dressing  6/23/2018  1:55 AM   Number of days:0       Peripheral IV 06/23/18 Left Upper forearm (Active)   Site Assessment [employer] 6/23/2018  1:28 AM   Line Status Saline locked 6/23/2018  1:28 AM   Phlebitis Scale 0-->no symptoms 6/23/2018  1:28 AM   Infiltration Scale 0 6/23/2018  1:28 AM   Extravasation? No 6/23/2018  1:28 AM   Dressing Intervention New dressing  6/23/2018  1:28 AM   Number of days:0       Labs results:   Labs  Ordered and Resulted from Time of ED Arrival Up to the Time of Departure from the ED   CBC WITH PLATELETS DIFFERENTIAL - Abnormal; Notable for the following:        Result Value    RBC Count 3.90 (*)     Hemoglobin 11.8 (*)     Hematocrit 36.5 (*)     All other components within normal limits   COMPREHENSIVE METABOLIC PANEL - Abnormal; Notable for the following:     Glucose 228 (*)     Urea Nitrogen 35 (*)     Creatinine 1.56 (*)     GFR Estimate 43 (*)     GFR Estimate If Black 52 (*)     Calcium 8.4 (*)     ALT 72 (*)     AST 92 (*)     All other components within normal limits   NT PROBNP INPATIENT - Abnormal; Notable for the following:     N-Terminal Pro BNP Inpatient 2358 (*)     All other components within normal limits   TROPONIN I - Abnormal; Notable for the following:     Troponin I ES 0.103 (*)     All other components within normal limits   ROUTINE UA WITH MICROSCOPIC - Abnormal; Notable for the following:     Protein Albumin Urine 10 (*)     All other components within normal limits   ISTAT  GASES LACTATE JERSEY POCT - Abnormal; Notable for the following:     Ph Venous 7.31 (*)     PO2 Venous 22 (*)     All other components within normal limits   CBC WITH PLATELETS   TROPONIN I   ISTAT CG4 GASES LACTATE JERSEY NURSING POCT   ISTAT TROPONIN NURSING POCT   PLATELETS MONITORED PER HEPARIN TREATMENT PROTOCOL (FOR MEANINGFUL USE   NOTIFY PHYSICIAN   NOTIFY PHYSICIAN   TROPONIN POCT       Imaging Studies:   Recent Results (from the past 24 hour(s))   XR Chest Port 1 View    Narrative    1. Bilateral pleural effusions and adjacent basilar atelectasis and/or  consolidation.  2. Perihilar and mixed interstitial and airspace opacities favored to  represent pulmonary edema.  3. Cardiomegaly.       Recent vital signs:   /87  Pulse 108  Temp 97.8  F (36.6  C) (Axillary)  Resp 20  Wt 79.4 kg (175 lb)  SpO2 100%    Cardiac Rhythm: Normal Sinus  Pt needs tele? Yes  Skin/wound Issues: None    Code Status: Full  Code    Pain control: pt had none    Nausea control: pt had none    Abnormal labs/tests/findings requiring intervention: troponin elevations, BNP, POC US cardiac    Family present during ED course? Yes   Family Comments/Social Situation comments: friends are present    Tasks needing completion: None     Deepika Hector RN  [facility]-- [phone] [place] ED  [phone] [facility] ED

## 2018-06-24 VITALS
TEMPERATURE: 98 F | DIASTOLIC BLOOD PRESSURE: 63 MMHG | OXYGEN SATURATION: 97 % | RESPIRATION RATE: 16 BRPM | SYSTOLIC BLOOD PRESSURE: 138 MMHG | HEART RATE: 108 BPM | WEIGHT: 168.65 LBS

## 2018-06-24 LAB
ANION GAP SERPL CALCULATED.3IONS-SCNC: 8 MMOL/L (ref 3–14)
BUN SERPL-MCNC: 32 MG/DL (ref 7–30)
CALCIUM SERPL-MCNC: 8.7 MG/DL (ref 8.5–10.1)
CHLORIDE SERPL-SCNC: 105 MMOL/L (ref 94–109)
CO2 SERPL-SCNC: 26 MMOL/L (ref 20–32)
CREAT SERPL-MCNC: 1.34 MG/DL (ref 0.66–1.25)
GFR SERPL CREATININE-BSD FRML MDRD: 51 ML/MIN/1.7M2
GLUCOSE SERPL-MCNC: 113 MG/DL (ref 70–99)
LMWH PPP CHRO-ACNC: 0.31 IU/ML
MAGNESIUM SERPL-MCNC: 2.4 MG/DL (ref 1.6–2.3)
PHOSPHATE SERPL-MCNC: 3.2 MG/DL (ref 2.5–4.5)
POTASSIUM SERPL-SCNC: 4.2 MMOL/L (ref 3.4–5.3)
SODIUM SERPL-SCNC: 139 MMOL/L (ref 133–144)
TROPONIN I SERPL-MCNC: 1.5 UG/L (ref 0–0.04)

## 2018-06-24 PROCEDURE — 99239 HOSP IP/OBS DSCHRG MGMT >30: CPT | Mod: GC | Performed by: INTERNAL MEDICINE

## 2018-06-24 PROCEDURE — A9270 NON-COVERED ITEM OR SERVICE: HCPCS | Mod: GY | Performed by: INTERNAL MEDICINE

## 2018-06-24 PROCEDURE — 36415 COLL VENOUS BLD VENIPUNCTURE: CPT | Performed by: INTERNAL MEDICINE

## 2018-06-24 PROCEDURE — 84484 ASSAY OF TROPONIN QUANT: CPT | Performed by: INTERNAL MEDICINE

## 2018-06-24 PROCEDURE — 25000132 ZZH RX MED GY IP 250 OP 250 PS 637: Mod: GY | Performed by: INTERNAL MEDICINE

## 2018-06-24 PROCEDURE — 83735 ASSAY OF MAGNESIUM: CPT | Performed by: INTERNAL MEDICINE

## 2018-06-24 PROCEDURE — 85520 HEPARIN ASSAY: CPT | Performed by: INTERNAL MEDICINE

## 2018-06-24 PROCEDURE — 84100 ASSAY OF PHOSPHORUS: CPT | Performed by: INTERNAL MEDICINE

## 2018-06-24 PROCEDURE — 80048 BASIC METABOLIC PNL TOTAL CA: CPT | Performed by: INTERNAL MEDICINE

## 2018-06-24 PROCEDURE — 25000128 H RX IP 250 OP 636: Performed by: EMERGENCY MEDICINE

## 2018-06-24 RX ADMIN — Medication 12.5 MG: at 07:40

## 2018-06-24 RX ADMIN — PANTOPRAZOLE SODIUM 40 MG: 40 TABLET, DELAYED RELEASE ORAL at 07:40

## 2018-06-24 RX ADMIN — CLOPIDOGREL 75 MG: 75 TABLET, FILM COATED ORAL at 07:40

## 2018-06-24 RX ADMIN — ASPIRIN 81 MG CHEWABLE TABLET 81 MG: 81 TABLET CHEWABLE at 07:40

## 2018-06-24 RX ADMIN — HEPARIN SODIUM 750 UNITS/HR: 10000 INJECTION, SOLUTION INTRAVENOUS at 02:33

## 2018-06-24 RX ADMIN — ISOSORBIDE MONONITRATE 30 MG: 30 TABLET, EXTENDED RELEASE ORAL at 07:40

## 2018-06-24 NOTE — PLAN OF CARE
Problem: Patient Care Overview  Goal: Plan of Care/Patient Progress Review  Outcome: No Change  Assessment:   Cardiac: SB/SR, 1 episode of 4 seconds of VT 100s, 2 g of Mg given. Heparin gtt infusing.   Resp: 2 L NC while sleeping.   Neuro: Alert and orientated.   : Voiding per urinal.   Pain: Denies       Plan: Will continue to monitor/assess and update MD as needed

## 2018-06-24 NOTE — PROGRESS NOTES
PT discharged from . Pt stable, unlabored breathing, O2 sats >98% on Ra. BP stale. No ectopy. Voiding in urinal. Walks independently, good strength. All lines removed. Pt instructed on discharge education and home meds. Appointment with home MD in [place] set for Tuesday. Pt belongings sent with patient.

## 2018-06-24 NOTE — PROGRESS NOTES
CARDIOLOGY PROGRESS NOTE    SUBJECTIVE:  Patient with 4 beat run of NSVT overnight. 2g of Mg given for Mg 1.5. No other acute events.    ROS otherwise negative.    OBJECTIVE:  Vital signs:  BP range 123//67  HR 57-76  RR 16-18  SpO2 91-98% on 2L NC  Tm 97.9F    Vitals:    06/23/18 0251 06/23/18 0400 06/24/18 0400   Weight: 79.5 kg (175 lb 4.3 oz) 78.5 kg (173 lb 1 oz) 76.5 kg (168 lb 10.4 oz)       I/O:   Intake: +1231  Output: -5125  NET: -3893    PHYSICAL EXAM:  General: Elderly male resting comfortably, able to speak in complete sentences without difficulty   HEENT: PERRL; No icterus, pallor, or injection, oral mucosa moist and non-erythematous; no LAD; JVP is elevated    CV: RRR, LISA at RUSB radiating to carotids without weak carotid pulse, holosystolic murmur at apex radiating to axilla  Resp: Coarse crackles at bases b/l   Abd: Soft, non-tender, BS+, no masses appreciated, no rebound or guarding  Extremities: Radial and pedal pulses intact and symmetric, no pedal edema  Neuro: AAOx3, conversing appopriately, no lateralizing signs  Skin: No rashes evident  MSK: No joint tenderness, warmth, or edema    Recent Labs   Lab Test  06/24/18   0443   06/23/18   1651  06/23/18   0516   HGB   --    --    --   11.8*   HCT   --    --    --   34.9*   WBC   --    --    --   11.5*   MCV   --    --    --   92   MCH   --    --    --   31.1   MCHC   --    --    --   33.8   RDW   --    --    --   14.6   PLT   --    --    --   218   NA  139   < >  139  141   POTASSIUM  4.2   < >  3.6  3.7   CHLORIDE  105   < >  104  107   CO2  26   < >  24  23   BUN  32*   < >  35*  37*   CR  1.34*   < >  1.44*  1.45*   GLC  113*   < >  205*  119*   ELOISE  8.7   < >  8.6  8.5   ALBUMIN   --    --   3.6  3.6   BILITOTAL   --    --   0.7  0.4   ALKPHOS   --    --   131  126   AST   --    --   60*  74*   ALT   --    --   62  69    < > = values in this interval not displayed.       Medications:  ASA 81 mg daily  Atorvastatin 40 mg daily  Plavix 75  mg daily  Imdur 30 mg daily  Metoprolol Tartrate 12.5 mg BID  Pantoprazole 40 mg daily    Infusion  Heparin gtt    Imaging:  Echocardiogram, 6/23/2018:  Moderate left ventricular dilation is present.  Biplane LV EF 54%.  Hyperkinetic basal segments with akinetic mid and distal segments consistent  with stress cardiomyopathy if no CAD.  Moderate aortic stenosis is present.  Calculated valve area 0.9 to 1.1 cm2.  The inferior vena cava is normal.  No pericardial effusion is present.  Previous study not available for comparison.    ASSESSMENT/PLAN:  Kane Richards is a 84 year old male with a hx of CAD s/p CAB and aortic stenosis that presents with an NSTEMI with flash pulmonary edema.     # Hypertensive Emergency: Initial BP to 200/100 per EMS. Troponin peaked at 3.2 with ST depressions in lateral and anterior leads.  -Continue home Imdur, Metoprolol Tartrate. Increase Metoprolol back to home dose 25 mg BID at discharge.    # NSTEMI: Likely 2/2 hypertension in the setting of known CAD and AS. Will manage medically.  -Continue home ASA, Plavix, Atorvastatin    # Moderate/Severe AS: Valve area 0.9-1.1 cm2 with peak gradient 32.0 mmHg    Seen and staffed with Dr. Fish.    Yossi Cheng  Cardiology Fellow  Pager [phone]              I have seen and examined the patient with the CSI team. I agree with the assessment and plan of the note above.I have reviewed pertinent labs.     Aidan Fish MD  Interventional Cardiology  Pager: [phone]

## 2018-06-25 ENCOUNTER — CARE COORDINATION (OUTPATIENT)
Dept: CARE COORDINATION | Facility: CLINIC | Age: 83
End: 2018-06-25

## 2018-06-25 LAB
INTERPRETATION ECG - MUSE: NORMAL

## 2018-06-27 NOTE — DISCHARGE SUMMARY
[facility] Discharge Summary    Kane Richards MRN# [id number]   Age: 84 year old YOB: 1934     Date of Admission:  6/23/2018  Date of Discharge::  6/24/2018  2:30 PM  Admitting Physician:  Aidan Fish   Discharge Physician:  Brianna Rivers MD           Discharge Diagnosis:   Severe Aortic Stenosis          Procedures:   None           Discharge Medications:      Kane Richards   Home Medication Instructions ROBB:[id number]    Printed on:06/27/18 1486   Medication Information                      ASPIRIN PO  Take 81 mg by mouth daily             ATORVASTATIN CALCIUM PO  Take 40 mg by mouth daily             Clopidogrel Bisulfate (PLAVIX PO)  Take 75 mg by mouth daily             Coenzyme Q10 (COQ-10) 200 MG CAPS  Take 200 mg by mouth daily             ISOSORBIDE MONONITRATE PO  Take 30 mg by mouth daily             loperamide (IMODIUM) 2 MG capsule  Take 2 mg by mouth 4 times daily as needed for diarrhea             Losartan Potassium (COZAAR PO)  Take 100 mg by mouth daily             Metoprolol Tartrate (LOPRESSOR PO)  Take 25 mg by mouth 2 times daily             Multiple Vitamins-Minerals (CENTRUM SILVER) per tablet  Take 1 tablet by mouth daily             omega-3 acid ethyl esters (LOVAZA) 1 g capsule  Take 2 g by mouth daily             PANTOPRAZOLE SODIUM PO  Take 40 mg by mouth every morning (before breakfast)             TRAMADOL HCL PO               VITAMIN E BLEND PO  Take 1,000 Units by mouth daily                       Brief History of Illness:   Kane Richards is a 84 year old male with a hx of CAD s/p CAB and aortic stenosis that presents with shortness of breath and chest pain. States that it woke up from sleep tonight. Is travelling here from [place] for the car show and has been on his feet all day walking around at the car show. Had one beer tonight and then went to bed. Woke up around midnight with extreme shortness of breath and midline chest squeezing chest  discomfort associated with nausea. Chest discomfort was not like the pain he has had in the past with his MI's. Called EMS and was noted to be hypertensive in the 200's. Got 6 sublingual NTG's with resolution of pain, improvement in dyspnea, and reduction of BP. His oxygen saturation in the field was 91% and was placed on BIPAP. In the ED, his BP was 160/90 and he was tachy in the 100's. EKG's showed ST depression in V2 and V3 which later resolved. CXR showed signs of pulmonary edema and troponin was elevated. So, he was started on heparin gtt, given 40mg IV lasix, and admitted to Cards 1.   He endorses increasing dyspnea with exertion and occasional chest discomfort over the last few months. He is not very physically active owing to bad hips and knees but notices the discomfort with prolonged walking (few blocks). Last PCI with LUIS was in Feb, 2018 at [facility] in [place]. Prior to that, had another PCI in October, 2017 in [place].           Hospital Course:     Patient presented with elevated troponin in the setting of severe AS and BP in the 200's. Therefore, patient likely had subendocardial ischemia which led to troponin elevation. Patient remained chest pain free; therefore, concern for acute plaque rupture was low. Patient's BP was controlled by re-starting medications: Imdur 30mg and Metoprolol 25mg BID. He will need to follow up with his primary Cardiologist in [place] to fix his aortic valve. Of note, renal artery US was ordered to assess for KEN; however, this was negative.        Discharge Instructions and Follow-Up:   Discharge diet: Cardiac   Discharge activity: Activity as tolerated   Discharge follow-up: Follow up with primary care provider in 7 days           Discharge Disposition:   Discharged to home      Brianna Rivers MD     Thank you for allowing me to care for this patient. This has been discussed with the attending physician.    Brianna Rivers PGY-4   Cardiology Fellow   Pager:  [phone]    I have seen and examined the patient with the CSI team. I agree with the assessment and plan of the discharge summary note above.I have reviewed pertinent labs. More than 30 minutes were spent organizing the patient's discharge.    Aidan Fish MD  Interventional Cardiology  Pager: [phone]

## 2020-03-11 ENCOUNTER — HEALTH MAINTENANCE LETTER (OUTPATIENT)
Age: 85
End: 2020-03-11

## 2021-01-03 ENCOUNTER — HEALTH MAINTENANCE LETTER (OUTPATIENT)
Age: 86
End: 2021-01-03

## 2021-04-25 ENCOUNTER — HEALTH MAINTENANCE LETTER (OUTPATIENT)
Age: 86
End: 2021-04-25

## 2021-10-10 ENCOUNTER — HEALTH MAINTENANCE LETTER (OUTPATIENT)
Age: 86
End: 2021-10-10

## 2022-05-21 ENCOUNTER — HEALTH MAINTENANCE LETTER (OUTPATIENT)
Age: 87
End: 2022-05-21

## 2022-09-18 ENCOUNTER — HEALTH MAINTENANCE LETTER (OUTPATIENT)
Age: 87
End: 2022-09-18

## 2023-06-04 ENCOUNTER — HEALTH MAINTENANCE LETTER (OUTPATIENT)
Age: 88
End: 2023-06-04